# Patient Record
Sex: FEMALE | Race: WHITE | NOT HISPANIC OR LATINO | Employment: OTHER | URBAN - METROPOLITAN AREA
[De-identification: names, ages, dates, MRNs, and addresses within clinical notes are randomized per-mention and may not be internally consistent; named-entity substitution may affect disease eponyms.]

---

## 2018-02-26 ENCOUNTER — OFFICE VISIT (OUTPATIENT)
Dept: NEUROSURGERY | Facility: CLINIC | Age: 73
End: 2018-02-26
Payer: MEDICARE

## 2018-02-26 VITALS
BODY MASS INDEX: 36.32 KG/M2 | DIASTOLIC BLOOD PRESSURE: 70 MMHG | WEIGHT: 226 LBS | TEMPERATURE: 96 F | HEART RATE: 76 BPM | HEIGHT: 66 IN | SYSTOLIC BLOOD PRESSURE: 156 MMHG

## 2018-02-26 DIAGNOSIS — D32.9 MENINGIOMA: Primary | ICD-10-CM

## 2018-02-26 PROCEDURE — 99999 PR PBB SHADOW E&M-NEW PATIENT-LVL III: CPT | Mod: PBBFAC,,, | Performed by: NEUROLOGICAL SURGERY

## 2018-02-26 PROCEDURE — 1126F AMNT PAIN NOTED NONE PRSNT: CPT | Mod: ,,, | Performed by: NEUROLOGICAL SURGERY

## 2018-02-26 PROCEDURE — 99203 OFFICE O/P NEW LOW 30 MIN: CPT | Mod: PBBFAC | Performed by: NEUROLOGICAL SURGERY

## 2018-02-26 PROCEDURE — 99204 OFFICE O/P NEW MOD 45 MIN: CPT | Mod: S$PBB,,, | Performed by: NEUROLOGICAL SURGERY

## 2018-02-26 PROCEDURE — 1159F MED LIST DOCD IN RCRD: CPT | Mod: ,,, | Performed by: NEUROLOGICAL SURGERY

## 2018-02-26 RX ORDER — AMLODIPINE BESYLATE 10 MG/1
TABLET ORAL
COMMUNITY
Start: 2018-01-30

## 2018-02-26 RX ORDER — CLOPIDOGREL BISULFATE 75 MG/1
TABLET ORAL
COMMUNITY
Start: 2018-01-29

## 2018-02-26 RX ORDER — CARVEDILOL 3.12 MG/1
TABLET ORAL
COMMUNITY
Start: 2018-01-29

## 2018-02-26 RX ORDER — ATORVASTATIN CALCIUM 20 MG/1
TABLET, FILM COATED ORAL
COMMUNITY
Start: 2018-01-29

## 2018-02-26 RX ORDER — ASPIRIN 81 MG/1
81 TABLET ORAL DAILY
COMMUNITY

## 2018-02-26 RX ORDER — METFORMIN HYDROCHLORIDE 1000 MG/1
TABLET ORAL
COMMUNITY
Start: 2018-01-30

## 2018-02-26 RX ORDER — LISINOPRIL 5 MG/1
TABLET ORAL
COMMUNITY
Start: 2018-01-30

## 2018-02-26 NOTE — PROGRESS NOTES
This office note has been dictated.  February 26, 2018    Agus Gray M.D.  [facility]  [address] [place]  [address]    [id number]    Dear Satnam:    Mathieu Parry was seen in neurosurgical consultation at the office this morning.    As you know, she is a 72-year-old lady with a history of hypertension and   diabetes, who came to the hospital for a routine Cardiology appointment on   02/06/18, and apparently had a syncopal episode, lost consciousness and fell   forward striking her face.  A CT scan was done showing no facial fractures, but   a small tumor in the right cerebellopontine area was noted.  This led to an MRI   being done.  You saw her in neurosurgical evaluation and she was referred for   additional neurosurgical opinion.  The etiology of the fall is apparently   unclear.  The patient says that she has had some balance issues, but the fall   apparently was due to loss of consciousness rather than gait imbalance.  She has   had no specific headaches, no visual symptoms, loss of vision or double vision.    She feels that her hearing is equal bilaterally.  She has noted no difficulty   speaking or swallowing.  No focal weakness or numbness in extremities. Past   medical history includes hypertension, treated with Norvasc, Coreg, lisinopril   and diabetes treated with metformin.  She is generally active, drives a car,   goes shopping and carries out normal activities.    On physical examination, she is a well-developed, somewhat obese white lady who   is alert and cooperative.  Examination of the head shows bilateral periorbital   ecchymosis.  This is resolving.  The scalp itself is nontender.  There is no   specific tenderness over the right mastoid area.  Extraocular movements are   intact without nystagmus.  Pupils are equal and reactive to light and fundi show   clear disk margins.  Hearing is intact to finger rubbing  bilaterally.  The neck   is supple.  On neurological examination, she is speaking clearly, memory seems   intact and affect unremarkable.  Finger-to-nose was done well.  Gait is a little   slow, but not ataxic.  Cranial nerves are otherwise intact.  She has normal   facial sensation and movement.  The tongue protrudes in the midline.  She shows   good strength in the extremities, normal sensation and symmetrical deep tendon   reflexes.    MRI of the brain performed at [facility] in [place] on   02/14/18, was reviewed.  Ventricular size is normal.  There is minor white   matter microvascular disease related to hypertension.  On two of the sequences   is recognized an enhancing mass, which attached to the tentorium near the   petrous apex.  This mildly indents the yobany.  It appears above the auditory   meatus.  The appearance is quite typical of meningioma.    IMPRESSION:  Right petrous meningioma.    The tumor was found incidental to her fall.  She has no loss of facial   sensation, no facial weakness or hearing loss.  She does have mild gait   imbalance, which could relate to pressure against the yobany, although the   findings on MRI are quite mild.  I have told her that the meningioma is   generally a benign tumor.  In this location, it is often slow growing, but with   only one study available, growth potential is not determined.  I would recommend   that she have a followup MRI done in about six months and see if there is any   growth of the tumor.  If the tumor requires treatment, stereotactic radiosurgery   might be most appropriate.  I will plan to see her back in six months and very   much appreciate the opportunity to see her in neurosurgical consultation.    Sincerely yours,            LOULOU/CARLA  dd: 02/26/2018 10:08:47 (CST)  td: 02/27/2018 05:19:00 (CST)  Doc ID   #[id number]  Job ID #[id number]    CC: Belen Maguire

## 2018-02-26 NOTE — LETTER
February 26, 2018      Agus Gray MD  [address]  [address]  [place] [address]           [facility] - Neurosurgery 7th Fl  [address]  [place] [address]  Phone: [phone]          Patient: Belen Maguire   MR Number: [id number]   YOB: 1945   Date of Visit: 2/26/2018       Dear Dr. Agus Gray:    Thank you for referring Belen Maguire to me for evaluation. Attached you will find relevant portions of my assessment and plan of care.    If you have questions, please do not hesitate to call me. I look forward to following Belen Maguire along with you.    Sincerely,    Yariel Meredith MD    Enclosure  CC:  No Recipients    If you would like to receive this communication electronically, please contact [email or web address] or [phone] to request more information on [employer] access.    For providers and/or their staff who would like to refer a patient to [facility], please contact us through our one-stop-shop provider referral line, [facility], at [phone].    If you feel you have received this communication in error or would no longer like to receive these types of communications, please e-mail [email or web address]

## 2018-06-08 ENCOUNTER — TELEPHONE (OUTPATIENT)
Dept: NEUROSURGERY | Facility: CLINIC | Age: 73
End: 2018-06-08

## 2018-06-08 NOTE — TELEPHONE ENCOUNTER
----- Message from Sylvain Flores sent at 6/8/2018 10:16 AM CDT -----  Needs Advice    Reason for call: Pt is calling to schedule MRI/fu appt w/ the doctor for August.      Communication Preference: [phone]  Additional Information:

## 2018-08-20 ENCOUNTER — HOSPITAL ENCOUNTER (OUTPATIENT)
Dept: RADIOLOGY | Facility: HOSPITAL | Age: 73
Discharge: HOME | End: 2018-08-20
Attending: NEUROLOGICAL SURGERY
Payer: MEDICARE

## 2018-08-20 ENCOUNTER — OFFICE VISIT (OUTPATIENT)
Dept: NEUROSURGERY | Facility: CLINIC | Age: 73
End: 2018-08-20
Payer: MEDICARE

## 2018-08-20 VITALS
WEIGHT: 223.69 LBS | HEART RATE: 75 BPM | DIASTOLIC BLOOD PRESSURE: 68 MMHG | TEMPERATURE: 98 F | BODY MASS INDEX: 36.11 KG/M2 | SYSTOLIC BLOOD PRESSURE: 150 MMHG

## 2018-08-20 DIAGNOSIS — D32.9 MENINGIOMA: Primary | ICD-10-CM

## 2018-08-20 DIAGNOSIS — D32.9 MENINGIOMA: ICD-10-CM

## 2018-08-20 PROCEDURE — 70553 MRI BRAIN STEM W/O & W/DYE: CPT | Mod: TC

## 2018-08-20 PROCEDURE — 99213 OFFICE O/P EST LOW 20 MIN: CPT | Mod: PBBFAC,25 | Performed by: NEUROLOGICAL SURGERY

## 2018-08-20 PROCEDURE — 25500020 PHARM REV CODE 255: Performed by: NEUROLOGICAL SURGERY

## 2018-08-20 PROCEDURE — A9585 GADOBUTROL INJECTION: HCPCS | Performed by: NEUROLOGICAL SURGERY

## 2018-08-20 PROCEDURE — 70553 MRI BRAIN STEM W/O & W/DYE: CPT | Mod: 26,,, | Performed by: RADIOLOGY

## 2018-08-20 PROCEDURE — 99999 PR PBB SHADOW E&M-EST. PATIENT-LVL III: CPT | Mod: PBBFAC,,, | Performed by: NEUROLOGICAL SURGERY

## 2018-08-20 PROCEDURE — 99213 OFFICE O/P EST LOW 20 MIN: CPT | Mod: S$PBB,,, | Performed by: NEUROLOGICAL SURGERY

## 2018-08-20 RX ORDER — GADOBUTROL 604.72 MG/ML
10 INJECTION INTRAVENOUS
Status: COMPLETED | OUTPATIENT
Start: 2018-08-20 | End: 2018-08-20

## 2018-08-20 RX ADMIN — GADOBUTROL 10 ML: 604.72 INJECTION INTRAVENOUS at 01:08

## 2018-08-20 NOTE — PROGRESS NOTES
This office note has been dictated.  August 20, 2018    Agus Gray M.D.  [facility]  [address] [place], [place]  [address]    RE:  MATHIEU PARRY  [facility] No.:  [id number]    Dear Satnam:    Mathieu Parry returned in neurosurgical followup to the office this afternoon.    She has been doing well over the past six months with no new neurological   symptoms.  She does say that her balance continues to be something of a problem   for her.  She underwent an eye procedure recently.  She is not sure what was   done.  It sounds like perhaps laser treatment, perhaps for diabetic retinopathy.    Her hearing remains good.  She has had no speech difficulty, no focal weakness   or numbness of the extremities.    On brief examination, she is bright and alert and speaking clearly.  Extraocular   movements remain intact.  The pupils are equal.  Hearing is maintained   bilaterally.  She stood and walked without difficulty.    MRI of the brain was repeated at [facility] today and compared to her   previous study of 2-04-18 from [facility] in   [place].  Again has seen the right petroclival meningioma.  This mildly   indents the yobany.  It was measured 20 mm in the axial and coronal plane.  The   slices are somewhat different between the two scans, but there has not been any   significant enlargement of the tumor.  The fourth ventricle and aqueduct   remain open.  There is no hydrocephalus or other complicating findings.    I believe that we can continue to follow this tumor.  I will have her return in   about one year with a followup scan.  If she develops any new neurological   symptoms, she needs to contact us and we can see her earlier.  Thank you again   for the opportunity to work with you in her care.    Sincerely yours,      LOULOU/CARLA  dd: 08/20/2018 14:48:12 (CDT)  td: 08/21/2018 02:36:39 (CDT)  Doc ID   #[id number]  Job ID #[id number]    CC:

## 2019-05-16 ENCOUNTER — TELEPHONE (OUTPATIENT)
Dept: NEUROSURGERY | Facility: CLINIC | Age: 74
End: 2019-05-16

## 2019-05-16 DIAGNOSIS — D32.9 BENIGN MENINGIOMA: Primary | ICD-10-CM

## 2019-08-05 ENCOUNTER — OFFICE VISIT (OUTPATIENT)
Dept: NEUROSURGERY | Facility: CLINIC | Age: 74
End: 2019-08-05
Payer: MEDICARE

## 2019-08-05 ENCOUNTER — HOSPITAL ENCOUNTER (OUTPATIENT)
Dept: RADIOLOGY | Facility: HOSPITAL | Age: 74
Discharge: HOME | End: 2019-08-05
Attending: NEUROLOGICAL SURGERY
Payer: MEDICARE

## 2019-08-05 VITALS
DIASTOLIC BLOOD PRESSURE: 75 MMHG | BODY MASS INDEX: 35.47 KG/M2 | HEART RATE: 62 BPM | HEIGHT: 67 IN | WEIGHT: 226 LBS | SYSTOLIC BLOOD PRESSURE: 193 MMHG

## 2019-08-05 DIAGNOSIS — D32.9 MENINGIOMA: Primary | ICD-10-CM

## 2019-08-05 DIAGNOSIS — D32.9 MENINGIOMA: ICD-10-CM

## 2019-08-05 LAB
CREAT SERPL-MCNC: 0.5 MG/DL (ref 0.5–1.4)
SAMPLE: NORMAL

## 2019-08-05 PROCEDURE — A9585 GADOBUTROL INJECTION: HCPCS | Performed by: NEUROLOGICAL SURGERY

## 2019-08-05 PROCEDURE — 99999 PR PBB SHADOW E&M-EST. PATIENT-LVL III: CPT | Mod: PBBFAC,,, | Performed by: NEUROLOGICAL SURGERY

## 2019-08-05 PROCEDURE — 70553 MRI BRAIN W WO CONTRAST: ICD-10-PCS | Mod: 26,,, | Performed by: RADIOLOGY

## 2019-08-05 PROCEDURE — 70553 MRI BRAIN STEM W/O & W/DYE: CPT | Mod: TC

## 2019-08-05 PROCEDURE — 99213 OFFICE O/P EST LOW 20 MIN: CPT | Mod: S$PBB,,, | Performed by: NEUROLOGICAL SURGERY

## 2019-08-05 PROCEDURE — 99999 PR PBB SHADOW E&M-EST. PATIENT-LVL III: ICD-10-PCS | Mod: PBBFAC,,, | Performed by: NEUROLOGICAL SURGERY

## 2019-08-05 PROCEDURE — 25500020 PHARM REV CODE 255: Performed by: NEUROLOGICAL SURGERY

## 2019-08-05 PROCEDURE — 70553 MRI BRAIN STEM W/O & W/DYE: CPT | Mod: 26,,, | Performed by: RADIOLOGY

## 2019-08-05 PROCEDURE — 99213 OFFICE O/P EST LOW 20 MIN: CPT | Mod: PBBFAC,25 | Performed by: NEUROLOGICAL SURGERY

## 2019-08-05 PROCEDURE — 99213 PR OFFICE/OUTPT VISIT, EST, LEVL III, 20-29 MIN: ICD-10-PCS | Mod: S$PBB,,, | Performed by: NEUROLOGICAL SURGERY

## 2019-08-05 RX ORDER — GADOBUTROL 604.72 MG/ML
10 INJECTION INTRAVENOUS
Status: COMPLETED | OUTPATIENT
Start: 2019-08-05 | End: 2019-08-05

## 2019-08-05 RX ADMIN — GADOBUTROL 10 ML: 604.72 INJECTION INTRAVENOUS at 09:08

## 2019-08-05 NOTE — PROGRESS NOTES
This office note has been dictated.  August 05, 2019          Agus Gray M.D.  [facility]  [address] [place] [address]        RE;  MATHIEU PARRY  [facility] No.:  [id number]      Dear Satnam:    Mathieu Parry was seen in neurosurgical followup at the office this morning.  Her   symptoms remain about the same.  She does not describe any double vision,   facial numbness or paresthesias, hearing loss or facial weakness, but does   continue to worry about her balance.  She was told that she might have early   macular degeneration.  She has had no specific weakness of her arms or legs.    On examination today, she is alert and cooperative.  Extraocular movements are   full.  Pupils are equal and reactive to light.  The left fundus was seen with a   little difficulty.  The right fundus shows normal optic nerve.  Hearing is   intact to finger rubbing and tuning fork bilaterally and midline tuning fork is   not referred.  She has normal facial sensation and movement.  The tongue   protrudes in the midline.  She shows good strength in extremities.  She walked   fairly well, but maybe veers a little to the left.    MRI of the brain was done at [facility] today and compared to her study of   August 2018, again is seen the right petroclival meningioma.  This extends from   the tentorium and trigeminal nerve down to the internal auditory meatus and   mildly indents the yobany.  The size and shape are the same on the two studies.    There is no apparent growth of the tumor.    Except for balance issues, at this point she is asymptomatic and the tumor has   shown no growth.  I will plan to have her follow up in one year with MRI.  If   she develops any new symptoms before that time, she should call.    Again, I appreciate being allowed to work with you in her care.    Sincerely yours,      LOULOU/CARLA  dd: 08/05/2019 12:06:47 (CDT)  td: 08/06/2019 01:22:08 (CDT)  Doc ID   #[id number]  Job ID  #[id number]    CC: Belen Maguire

## 2020-07-20 ENCOUNTER — TELEPHONE (OUTPATIENT)
Dept: NEUROSURGERY | Facility: CLINIC | Age: 75
End: 2020-07-20

## 2020-07-20 DIAGNOSIS — D32.9 MENINGIOMA: Primary | ICD-10-CM

## 2020-08-21 ENCOUNTER — TELEPHONE (OUTPATIENT)
Dept: NEUROSURGERY | Facility: CLINIC | Age: 75
End: 2020-08-21

## 2020-08-21 NOTE — TELEPHONE ENCOUNTER
----- Message from Dominga Wilson sent at 8/21/2020  9:01 AM CDT -----  Regarding: reschedule  Pt calling in regards to she needs to reschedule her appts she have on August 24th......the patient states she has been running fever for 2 days and not feeling well to attend appts, provider appt jaron is not showing future dates          Please advise pt can be contact at [phone]

## 2020-08-21 NOTE — TELEPHONE ENCOUNTER
SW PT, NOT FEELING WELL, HIGH TEMP AND IS REQUESTING TO POSTPONE THE MRI AND FU WITH DR THOMPSON ON Monday. APPTS CXLD, WILL CALL ME BACK TO [facility] WHEN SHE IS FEELING BETTER.

## 2020-11-06 ENCOUNTER — TELEPHONE (OUTPATIENT)
Dept: NEUROSURGERY | Facility: CLINIC | Age: 75
End: 2020-11-06

## 2020-11-06 NOTE — TELEPHONE ENCOUNTER
----- Message from Lorna Blackwood sent at 11/6/2020  1:43 PM CST -----  Regarding: pt advice  Contact: Cee ([facility]) @ [phone]  Cee ([place] [facility]) calling to speak with someone in Dr. Meredith's office regarding getting copies of any labs results, MRI or any testing done. Caller was transferred to the film room.  Please call.

## 2020-11-06 NOTE — TELEPHONE ENCOUNTER
----- Message from Lakshmi Perez sent at 11/6/2020  2:53 PM CST -----  Regarding: Cee Mike is returning MERCEDES's call in ref to the pt MRI can you please call Cee at [phone] ext [phone].    RITA